# Patient Record
(demographics unavailable — no encounter records)

---

## 2025-02-13 NOTE — HISTORY OF PRESENT ILLNESS
[FreeTextEntry1] : This is a pleasant 23 yr old F seen today for a new patient visit regarding concerns of hirsutism and mood d/o around the time of the periods.  She is accompanied by her parents today  She has a known hx of autism and follows with a therapist/ psychiatrist  Mother states that menstrual cycles are regular.